# Patient Record
Sex: FEMALE | Race: WHITE | Employment: FULL TIME | URBAN - METROPOLITAN AREA
[De-identification: names, ages, dates, MRNs, and addresses within clinical notes are randomized per-mention and may not be internally consistent; named-entity substitution may affect disease eponyms.]

---

## 2022-11-12 ENCOUNTER — OFFICE VISIT (OUTPATIENT)
Dept: URGENT CARE | Age: 62
End: 2022-11-12
Payer: COMMERCIAL

## 2022-11-12 VITALS
DIASTOLIC BLOOD PRESSURE: 78 MMHG | HEIGHT: 63 IN | RESPIRATION RATE: 16 BRPM | SYSTOLIC BLOOD PRESSURE: 141 MMHG | TEMPERATURE: 98.1 F | BODY MASS INDEX: 23.92 KG/M2 | HEART RATE: 81 BPM | WEIGHT: 135 LBS | OXYGEN SATURATION: 100 %

## 2022-11-12 DIAGNOSIS — L25.9 CONTACT DERMATITIS, UNSPECIFIED CONTACT DERMATITIS TYPE, UNSPECIFIED TRIGGER: Primary | ICD-10-CM

## 2022-11-12 LAB
ALBUMIN SERPL-MCNC: 3.5 G/DL
ALKALINE PHOS POC: 105
ALT SERPL-CCNC: 21 U/L (ref 7–35)
AST SERPL-CCNC: 31 U/L (ref 13–35)
BUN BLD-MCNC: 11 MG/DL
CALCIUM BLD-MCNC: 9.3 MG/DL
CHLORIDE BLD-SCNC: 104 MMOL/L
CO2 POC: 28 MEQ/L
CREAT BLD-MCNC: 0.7 MG/DL
EGFR (POC): ABNORMAL
GLUCOSE POC: 93 MG/DL
POTASSIUM SERPL-SCNC: 4.3 MMOL/L
PROT SERPL-MCNC: 6.7 G/DL
SODIUM SERPL-SCNC: 147 MMOL/L
TOTAL BILIRUBIN POC: 0.5 MG/DL

## 2022-11-12 PROCEDURE — 99203 OFFICE O/P NEW LOW 30 MIN: CPT | Performed by: NURSE PRACTITIONER

## 2022-11-12 PROCEDURE — 80053 COMPREHEN METABOLIC PANEL: CPT | Performed by: NURSE PRACTITIONER

## 2022-11-12 RX ORDER — BIOTIN 1000 MCG
TABLET,CHEWABLE ORAL
COMMUNITY

## 2022-11-12 RX ORDER — PREDNISONE 5 MG/1
TABLET ORAL
Qty: 21 TABLET | Refills: 0 | Status: SHIPPED | OUTPATIENT
Start: 2022-11-12

## 2022-11-12 RX ORDER — BISMUTH SUBSALICYLATE 262 MG
1 TABLET,CHEWABLE ORAL DAILY
COMMUNITY

## 2022-11-12 NOTE — PATIENT INSTRUCTIONS
Results for orders placed or performed in visit on 11/12/22   AMB POC COMPREHENSIVE METABOLIC PANEL   Result Value Ref Range    GLUCOSE 93 mg/dL    BUN 11 mg/dL    Creatinine (POC) 0.7 mg/dL    Sodium (POC) 147 MMOL/L    Potassium (POC) 4.3 MMOL/L    CHLORIDE 104 MMOL/L    CO2 28 mEq/L    CALCIUM 9.3 mg/dL    TOTAL PROTEIN 6.7 g/dL    ALBUMIN 3.5     AST (POC) 31 13 - 35 U/L    ALT (POC) 21 7 - 35 U/L    ALKALINE PHOS 105     TOTAL BILIRUBIN 0.5 mg/dL    eGFR (POC)

## 2022-11-12 NOTE — PROGRESS NOTES
Here for rash of right and left leg. Itchy non painful. Onset 1 week ago after starting new compression stockings  Has been wearing compression stockings for years but switched brands to a new one that is copper infused  Started having itchy red rash at location of stockings only. It is non painful. She denies any worsening LE swelling. Denies any fever, chills, malaise. Overall not improved. Has noted similar rash just on her lower back over past few days. Past Medical History:   Diagnosis Date    Arthritis         History reviewed. No pertinent surgical history. History reviewed. No pertinent family history. Social History     Socioeconomic History    Marital status: SINGLE     Spouse name: Not on file    Number of children: Not on file    Years of education: Not on file    Highest education level: Not on file   Occupational History    Not on file   Tobacco Use    Smoking status: Never     Passive exposure: Never    Smokeless tobacco: Never   Substance and Sexual Activity    Alcohol use: Not Currently    Drug use: Never    Sexual activity: Not on file   Other Topics Concern    Not on file   Social History Narrative    Not on file     Social Determinants of Health     Financial Resource Strain: Not on file   Food Insecurity: Not on file   Transportation Needs: Not on file   Physical Activity: Not on file   Stress: Not on file   Social Connections: Not on file   Intimate Partner Violence: Not on file   Housing Stability: Not on file                ALLERGIES: House dust and Mold    Review of Systems   All other systems reviewed and are negative. Vitals:    11/12/22 1512 11/12/22 1525 11/12/22 1527   BP:  (!) 147/82 (!) 141/78   Pulse:  81    Resp:  16    Temp:  98.1 °F (36.7 °C)    SpO2:  100%    Weight: 135 lb (61.2 kg)     Height: 5' 2.5\" (1.588 m)         Physical Exam  Vitals reviewed. Constitutional:       General: She is not in acute distress. Appearance: Normal appearance.  She is not ill-appearing, toxic-appearing or diaphoretic. HENT:      Mouth/Throat:      Mouth: Mucous membranes are moist.      Pharynx: No oropharyngeal exudate or posterior oropharyngeal erythema. Eyes:      Extraocular Movements: Extraocular movements intact. Cardiovascular:      Rate and Rhythm: Normal rate and regular rhythm. Pulmonary:      Effort: Pulmonary effort is normal.      Breath sounds: Normal breath sounds. Abdominal:      General: Abdomen is flat. There is no distension. Palpations: Abdomen is soft. There is no hepatomegaly, splenomegaly or mass. Tenderness: There is no abdominal tenderness. There is no right CVA tenderness, left CVA tenderness, guarding or rebound. Negative signs include Hernandez's sign. Hernia: No hernia is present. Skin:     Capillary Refill: Capillary refill takes less than 2 seconds. Neurological:      Mental Status: She is alert and oriented to person, place, and time. Psychiatric:         Mood and Affect: Mood normal.         Behavior: Behavior normal.         Thought Content: Thought content normal.       MDM     Differential Diagnosis; Clinical Impression; Plan:       CLINICAL IMPRESSION:  (L25.9) Contact dermatitis, unspecified contact dermatitis type, unspecified trigger  (primary encounter diagnosis)    Orders Placed This Encounter      predniSONE (STERAPRED) 5 mg dose pack          Sig: See administration instruction per 5mg dose pack          Dispense:  21 Tablet          Refill:  0      Plan:  Rash likely contact derm given distinct timing with starting new copper compression stockings. Ddx venous stasis dermatitis  Switch back to old brand of compression stockings  No evidence suggesting cellulitis  5mg steroid dosepack for itching relief/rash relief  POC CMP checked - unremarkable. No elevated liver enzymes.   See PCP if not improving over next 1 week      We have reviewed concerning signs/symptoms, normal vs abnormal progression of medical condition and when to seek immediate medical attention.             Procedures